# Patient Record
Sex: FEMALE | Race: WHITE | NOT HISPANIC OR LATINO | URBAN - METROPOLITAN AREA
[De-identification: names, ages, dates, MRNs, and addresses within clinical notes are randomized per-mention and may not be internally consistent; named-entity substitution may affect disease eponyms.]

---

## 2021-06-01 LAB
ABS NEUT (OLG): 5.11 X10(3)/MCL (ref 2.1–9.2)
BASOPHILS # BLD AUTO: 0 X10(3)/MCL (ref 0–0.2)
BASOPHILS NFR BLD AUTO: 1 %
BUN SERPL-MCNC: 15.2 MG/DL (ref 7–18.7)
CALCIUM SERPL-MCNC: 8.5 MG/DL (ref 8.4–10.2)
CHLORIDE SERPL-SCNC: 108 MMOL/L (ref 98–107)
CO2 SERPL-SCNC: 22 MMOL/L (ref 22–29)
CREAT SERPL-MCNC: 0.73 MG/DL (ref 0.55–1.02)
CREAT/UREA NIT SERPL: 21
EOSINOPHIL # BLD AUTO: 0.1 X10(3)/MCL (ref 0–0.9)
EOSINOPHIL NFR BLD AUTO: 1 %
ERYTHROCYTE [DISTWIDTH] IN BLOOD BY AUTOMATED COUNT: 14.6 % (ref 11.5–17)
GLUCOSE SERPL-MCNC: 76 MG/DL (ref 74–100)
HCT VFR BLD AUTO: 40.6 % (ref 37–47)
HGB BLD-MCNC: 12.8 GM/DL (ref 12–16)
LYMPHOCYTES # BLD AUTO: 1.9 X10(3)/MCL (ref 0.6–4.6)
LYMPHOCYTES NFR BLD AUTO: 25 %
MCH RBC QN AUTO: 30 PG (ref 27–31)
MCHC RBC AUTO-ENTMCNC: 31.5 GM/DL (ref 33–36)
MCV RBC AUTO: 95.3 FL (ref 80–94)
MONOCYTES # BLD AUTO: 0.6 X10(3)/MCL (ref 0.1–1.3)
MONOCYTES NFR BLD AUTO: 7 %
NEUTROPHILS # BLD AUTO: 5.11 X10(3)/MCL (ref 2.1–9.2)
NEUTROPHILS NFR BLD AUTO: 66 %
PLATELET # BLD AUTO: 485 X10(3)/MCL (ref 130–400)
PMV BLD AUTO: 10 FL (ref 9.4–12.4)
POTASSIUM SERPL-SCNC: 4.3 MMOL/L (ref 3.5–5.1)
RBC # BLD AUTO: 4.26 X10(6)/MCL (ref 4.2–5.4)
SODIUM SERPL-SCNC: 141 MMOL/L (ref 136–145)
WBC # SPEC AUTO: 7.8 X10(3)/MCL (ref 4.5–11.5)

## 2021-06-04 ENCOUNTER — HISTORICAL (OUTPATIENT)
Dept: SURGERY | Facility: HOSPITAL | Age: 46
End: 2021-06-04

## 2021-11-03 LAB
EST. AVERAGE GLUCOSE BLD GHB EST-MCNC: 108 MG/DL (ref 70–115)
ESTRADIOL SERPL HS-MCNC: 94.4 PG/ML
FSH SERPL-ACNC: 2.74 MIU/ML
HBA1C MFR BLD: 5.6 % (ref 4–6)
T4 FREE SERPL-MCNC: 0.65 NG/DL (ref 0.78–2.19)

## 2021-12-01 LAB
CHOLEST SERPL-MCNC: 232 MG/DL (ref 0–200)
HDLC SERPL-MCNC: 69 MG/DL (ref 40–60)
LDLC SERPL CALC-MCNC: 132.2 MG/DL (ref 30–100)
T4 FREE SERPL-MCNC: 0.83 NG/DL (ref 0.78–2.19)
TRIGL SERPL-MCNC: 66 MG/DL (ref 30–200)
TSH SERPL-ACNC: 2.02 UIU/ML (ref 0.36–3.74)

## 2022-04-11 ENCOUNTER — HISTORICAL (OUTPATIENT)
Dept: ADMINISTRATIVE | Facility: HOSPITAL | Age: 47
End: 2022-04-11

## 2022-04-27 VITALS
WEIGHT: 172.19 LBS | HEIGHT: 63 IN | BODY MASS INDEX: 30.51 KG/M2 | DIASTOLIC BLOOD PRESSURE: 72 MMHG | SYSTOLIC BLOOD PRESSURE: 112 MMHG

## 2022-04-30 NOTE — OP NOTE
DATE OF SURGERY:    06/04/2021    SURGEON:  Cecil Sevilla MD    PREOPERATIVE DIAGNOSIS:  Right lower pole renal stone.    POSTOPERATIVE DIAGNOSIS:  Right lower pole renal stone.    PROCEDURE PERFORMED:  Extracorporeal shock wave lithotripsy.    ANESTHESIA:  General.    COMPLICATIONS:  None.    ESTIMATED BLOOD LOSS:  None.    FINDINGS:  8 mm lower pole stone treated with 2500 shocks at level 7.  Stone fragmented very well.    INDICATIONS:  A 45-year-old female found to have an 8 mm right lower pole stone.  Although asymptomatic at this time, she wanted to have this treated.  Recommended ESWL.  We discussed placing a stent and treating in situ.  She was agreeable with in situ treatment.  Aware of the risk approximately 10% of having to have a stent placed for secondary procedure to relieve obstruction.    DESCRIPTION OF PROCEDURE:  Informed consent was obtained, taken to the operative suite, and underwent general anesthesia.  She was placed supine over the shock head.  Stone was localized in 2 planes.  She underwent a total of 2500 shocks at level 7.  There was no ventricular ectopy or problems.  Stone fragmented very well.  Due to the stone's location, fragmentation and size, initially elected not to place a stent.  She was awakened and taken to the recovery room in stable condition.        ______________________________  Cecil Sevilla MD    TAB/SK  DD:  06/04/2021  Time:  08:02AM  DT:  06/04/2021  Time:  08:15AM  Job #:  [id number]

## 2022-05-09 ENCOUNTER — HISTORICAL (OUTPATIENT)
Dept: ADMINISTRATIVE | Facility: HOSPITAL | Age: 47
End: 2022-05-09

## 2022-07-27 PROBLEM — D47.3 ESSENTIAL THROMBOCYTOSIS: Status: ACTIVE | Noted: 2022-07-27
